# Patient Record
Sex: MALE | Race: WHITE | NOT HISPANIC OR LATINO | Employment: OTHER | RURAL
[De-identification: names, ages, dates, MRNs, and addresses within clinical notes are randomized per-mention and may not be internally consistent; named-entity substitution may affect disease eponyms.]

---

## 2017-01-01 ENCOUNTER — CONSULT (OUTPATIENT)
Dept: CARDIOLOGY | Facility: CLINIC | Age: 82
End: 2017-01-01

## 2017-01-01 VITALS
DIASTOLIC BLOOD PRESSURE: 90 MMHG | SYSTOLIC BLOOD PRESSURE: 160 MMHG | BODY MASS INDEX: 25.01 KG/M2 | HEART RATE: 86 BPM | WEIGHT: 165 LBS | HEIGHT: 68 IN

## 2017-01-01 DIAGNOSIS — I50.32 CHRONIC DIASTOLIC CONGESTIVE HEART FAILURE (HCC): ICD-10-CM

## 2017-01-01 DIAGNOSIS — I10 ESSENTIAL HYPERTENSION: ICD-10-CM

## 2017-01-01 DIAGNOSIS — I20.9 ANGINA PECTORIS (HCC): Primary | ICD-10-CM

## 2017-01-01 DIAGNOSIS — I48.21 PERMANENT ATRIAL FIBRILLATION (HCC): ICD-10-CM

## 2017-01-01 PROCEDURE — 99204 OFFICE O/P NEW MOD 45 MIN: CPT | Performed by: INTERNAL MEDICINE

## 2017-01-01 RX ORDER — SERTRALINE HYDROCHLORIDE 100 MG/1
100 TABLET, FILM COATED ORAL DAILY
COMMUNITY

## 2017-01-01 RX ORDER — NITROGLYCERIN 0.4 MG/1
0.4 TABLET SUBLINGUAL
COMMUNITY

## 2017-01-01 RX ORDER — ALCOHOL 70.47 ML/100ML
1 GEL TOPICAL DAILY
COMMUNITY

## 2017-01-01 RX ORDER — SODIUM PHOSPHATE, DIBASIC AND SODIUM PHOSPHATE, MONOBASIC 7; 19 G/133ML; G/133ML
ENEMA RECTAL ONCE AS NEEDED
COMMUNITY

## 2017-01-01 RX ORDER — FUROSEMIDE 40 MG/1
40 TABLET ORAL DAILY
COMMUNITY

## 2017-01-01 RX ORDER — ONDANSETRON 4 MG/1
4 TABLET, FILM COATED ORAL EVERY 4 HOURS PRN
COMMUNITY

## 2017-01-01 RX ORDER — GUAIFENESIN 200 MG/10ML
200 LIQUID ORAL EVERY 6 HOURS PRN
COMMUNITY

## 2017-01-01 RX ORDER — AMLODIPINE BESYLATE 10 MG/1
10 TABLET ORAL DAILY
COMMUNITY

## 2017-01-01 RX ORDER — HYDRALAZINE HYDROCHLORIDE 10 MG/1
10 TABLET, FILM COATED ORAL 3 TIMES DAILY
COMMUNITY

## 2017-01-01 RX ORDER — BISACODYL 10 MG
10 SUPPOSITORY, RECTAL RECTAL DAILY PRN
COMMUNITY

## 2017-01-01 RX ORDER — FERROUS SULFATE 325(65) MG
325 TABLET ORAL 2 TIMES DAILY
COMMUNITY

## 2017-02-23 PROBLEM — I48.21 PERMANENT ATRIAL FIBRILLATION (HCC): Status: ACTIVE | Noted: 2017-01-01

## 2017-02-23 PROBLEM — I10 ESSENTIAL HYPERTENSION: Status: ACTIVE | Noted: 2017-01-01

## 2017-02-23 PROBLEM — I50.32 CHRONIC DIASTOLIC CONGESTIVE HEART FAILURE (HCC): Status: ACTIVE | Noted: 2017-01-01

## 2017-02-23 PROBLEM — I20.9 ANGINA PECTORIS (HCC): Status: ACTIVE | Noted: 2017-01-01

## 2017-02-23 NOTE — PROGRESS NOTES
"    Subjective:     Encounter Date:02/23/2017      Patient ID: Hosea Bhardwaj is a 89 y.o. male.    Chief Complaint: Chest pain and atrial fibrillation.  HPI  This is an 89-year-old male patient who is a resident of a local nursing home who was referred for evaluation of chest discomfort and permanent atrial fibrillation.  The patient has moderately advanced dementia and was unable to provide very much in the way of history.  He is also very \"hard of hearing\" and had difficulty understanding questions.  The person accompanying him from the nursing home was unaware of any of his clinical circumstances.  The patient's daughter was supposed to accompany him to clinic today to provide additional information but she did not show up.  As best I can tell the patient reports having chest discomfort on occasion usually with some form of physical activity.  He appears to be wheelchair-bound.  He cannot characterize the discomfort in terms of its location other than saying \"it hurts all over\".  It is unclear if there are any associated symptoms.  He cannot identify any other precipitating aggravating or alleviating features.  The discomfort seems to improve with rest and/or sublingual nitroglycerin.  His caregiver at present is unaware of whether or not he is taking any doses of sublingual nitroglycerin.  He denies having shortness of breath at rest or with activity.  He reports no orthopnea PND or lower extremity edema.  He has no dizziness palpitations or syncope.  He is apparently in permanent atrial fibrillation.  The details regarding the duration of his atrial fibrillation and/or associated conditions such as prior stroke or TIA are unavailable.  He does have a history of hypertension as well as hypertensive related cardiac dysfunction\congestive heart failure with preserved ejection fraction.  He is a nonsmoker.  His family history is unobtainable.  His chads 2 vascular score is at least 4.  However he is considered to " be a very poor candidate for long-term Coumadin anticoagulation due to his advanced age, fall risk and advanced chronic renal failure.  It is unknown whether he has had prior problems with anemia or bleeding.  This would further escalate his risk of a bleeding complication from long-term anticoagulation therapy.  The following portions of the patient's history were reviewed and updated as appropriate: allergies, current medications, past family history, past medical history, past social history, past surgical history and problem  Review of Systems   Constitution: Negative for chills, diaphoresis, fever, weakness, malaise/fatigue, night sweats, weight gain and weight loss.   HENT: Negative for ear discharge, hearing loss, hoarse voice and nosebleeds.    Eyes: Negative for discharge, double vision, pain and photophobia.   Cardiovascular: Positive for chest pain. Negative for claudication, cyanosis, dyspnea on exertion, irregular heartbeat, leg swelling, near-syncope, orthopnea, palpitations, paroxysmal nocturnal dyspnea and syncope.   Respiratory: Negative for cough, hemoptysis, shortness of breath, sputum production and wheezing.    Endocrine: Negative for cold intolerance, heat intolerance, polydipsia, polyphagia and polyuria.   Hematologic/Lymphatic: Negative for adenopathy and bleeding problem. Does not bruise/bleed easily.   Skin: Negative for color change, flushing, itching and rash.   Musculoskeletal: Negative for muscle cramps, muscle weakness, myalgias and stiffness.   Gastrointestinal: Negative for abdominal pain, diarrhea, hematemesis, hematochezia, nausea and vomiting.   Genitourinary: Negative for dysuria, frequency and nocturia.   Neurological: Negative for focal weakness, loss of balance, numbness, paresthesias and seizures.   Psychiatric/Behavioral: Negative for altered mental status, hallucinations and suicidal ideas.   Allergic/Immunologic: Negative for HIV exposure, hives and persistent infections.        Current Outpatient Prescriptions:   •  amLODIPine (NORVASC) 10 MG tablet, Take 10 mg by mouth Daily., Disp: , Rfl:   •  aspirin 81 MG tablet, Take 81 mg by mouth Daily., Disp: , Rfl:   •  bisacodyl (DULCOLAX) 10 MG suppository, Insert 10 mg into the rectum Daily As Needed for constipation., Disp: , Rfl:   •  ferrous sulfate 325 (65 FE) MG tablet, Take 325 mg by mouth 2 (Two) Times a Day., Disp: , Rfl:   •  furosemide (LASIX) 40 MG tablet, Take 40 mg by mouth Daily., Disp: , Rfl:   •  guaifenesin (ROBITUSSIN) 100 MG/5ML liquid, Take 200 mg by mouth Every 6 (Six) Hours As Needed for cough., Disp: , Rfl:   •  hydrALAZINE (APRESOLINE) 10 MG tablet, Take 10 mg by mouth 3 (Three) Times a Day., Disp: , Rfl:   •  ipratropium (ATROVENT) 0.02 % nebulizer solution, Take 500 mcg by nebulization Every 6 (Six) Hours As Needed for wheezing or shortness of air., Disp: , Rfl:   •  Memantine HCl-Donepezil HCl (NAMZARIC) 14-10 MG capsule sustained-release 24 hr, Take 1 tablet by mouth Daily., Disp: , Rfl:   •  Multiple Vitamin (THEREMS) tablet, Take 1 tablet by mouth Daily., Disp: , Rfl:   •  nitroglycerin (NITROSTAT) 0.4 MG SL tablet, Place 0.4 mg under the tongue Every 5 (Five) Minutes As Needed for chest pain. Take no more than 3 doses in 15 minutes., Disp: , Rfl:   •  Nutritional Supplements (NEPRO PO), Take 1 tablet by mouth 2 (Two) Times a Day., Disp: , Rfl:   •  ondansetron (ZOFRAN) 4 MG tablet, Take 4 mg by mouth Every 4 (Four) Hours As Needed for nausea or vomiting., Disp: , Rfl:   •  sertraline (ZOLOFT) 100 MG tablet, Take 100 mg by mouth Daily., Disp: , Rfl:   •  Sodium Phosphates (FLEET ENEMA) 7-19 GM/118ML enema, Insert  into the rectum 1 (One) Time As Needed., Disp: , Rfl:   •  tiotropium (SPIRIVA) 18 MCG per inhalation capsule, Place 1 capsule into inhaler and inhale Daily., Disp: , Rfl:   •  TRAMADOL HCL PO, Take  by mouth Daily As Needed., Disp: , Rfl:   •  tuberculin (TUBERSOL) 5 UNIT/0.1ML injection, Inject   "into the skin. Once a year, Disp: , Rfl:      Objective:     Physical Exam   Constitutional: He is oriented to person, place, and time. He appears well-developed and well-nourished.   HENT:   Head: Normocephalic and atraumatic.   Mouth/Throat: Oropharynx is clear and moist.   Eyes: Conjunctivae and EOM are normal. Pupils are equal, round, and reactive to light. No scleral icterus.   Neck: Normal range of motion. Neck supple. No JVD present. No tracheal deviation present. No thyromegaly present.   Cardiovascular: Normal rate, S1 normal, S2 normal, normal heart sounds, intact distal pulses and normal pulses.  An irregularly irregular rhythm present. PMI is not displaced.  Exam reveals no gallop and no friction rub.    No murmur heard.  Pulmonary/Chest: Effort normal and breath sounds normal. No respiratory distress. He has no wheezes. He has no rales.   Abdominal: Soft. Bowel sounds are normal. He exhibits no distension and no mass. There is no tenderness. There is no rebound and no guarding.   Musculoskeletal: Normal range of motion. He exhibits no edema or deformity.   Neurological: He is alert and oriented to person, place, and time. He displays normal reflexes. No cranial nerve deficit. Coordination normal.   Skin: Skin is warm and dry. No rash noted. No erythema.   Psychiatric: He has a normal mood and affect. His behavior is normal. Thought content normal.     Blood pressure 160/90, pulse 86, height 68\" (172.7 cm), weight 165 lb (74.8 kg).   Lab Review:       Assessment:         1. Angina pectoris  His symptoms appear to be reasonably suspicious for exertional angina pectoris.  This appears to be best defined as chronic stable angina.  He is a poor candidate for noninvasive testing.  He is not a candidate for invasive cardiovascular testing or procedures.  Given his advanced age and multiple comorbidities I think it would be more prudent to adopt a conservative strategy of medical anti-anginal therapy.    2. " Essential hypertension  His blood pressure is poorly controlled.    3. Permanent atrial fibrillation  As best I can tell this is chronic\permanent atrial fibrillation.  It is unclear if he has had previous attempts at pharmacologic or electrical cardioversion.  He is certainly not a candidate for invasive therapies.  It is my opinion that he is a poor candidate for long-term anticoagulation therapy due to his advanced age, advanced chronic renal failure and fall risk.  His bleeding risk scores are as high or higher than his embolic event scores.    4. Chronic diastolic congestive heart failure  This appears to be due to chronic long-standing hypertension.  At the present time he appears to be well compensated.  He appears to be [place] [employer] Association functional class 1-2 in regards of symptoms.    Procedures     Plan:       In regards to his atrial fibrillation he is adequately worked rate controlled with beta blocker.  I recommended continuing his aspirin but would not advocate anticoagulation therapy due to his elevated bleeding risk.  In regards to his hypertension and angina I would recommend discontinuing his hydralazine.  I would recommend continuing his aspirin, sublingual nitroglycerin and amlodipine.  For additional antianginal therapies I would recommend adding Toprol-XL 50 mg once per day as well as Imdur 30 mg once in the morning.  I would not advocate any further cardiovascular testing.  At this point I would focus on conservative medical antianginal therapy.